# Patient Record
Sex: FEMALE | Race: WHITE | URBAN - METROPOLITAN AREA
[De-identification: names, ages, dates, MRNs, and addresses within clinical notes are randomized per-mention and may not be internally consistent; named-entity substitution may affect disease eponyms.]

---

## 2017-05-05 ENCOUNTER — OFFICE VISIT (OUTPATIENT)
Dept: INTERNAL MEDICINE CLINIC | Age: 27
End: 2017-05-05

## 2017-05-05 ENCOUNTER — TELEPHONE (OUTPATIENT)
Dept: INTERNAL MEDICINE CLINIC | Age: 27
End: 2017-05-05

## 2017-05-05 VITALS
TEMPERATURE: 98.2 F | WEIGHT: 136 LBS | SYSTOLIC BLOOD PRESSURE: 120 MMHG | DIASTOLIC BLOOD PRESSURE: 68 MMHG | BODY MASS INDEX: 24.48 KG/M2

## 2017-05-05 DIAGNOSIS — L70.9 ACNE, UNSPECIFIED ACNE TYPE: ICD-10-CM

## 2017-05-05 DIAGNOSIS — G43.109 MIGRAINE WITH AURA AND WITHOUT STATUS MIGRAINOSUS, NOT INTRACTABLE: Primary | ICD-10-CM

## 2017-05-05 PROCEDURE — 99213 OFFICE O/P EST LOW 20 MIN: CPT | Performed by: NURSE PRACTITIONER

## 2017-05-05 RX ORDER — RIZATRIPTAN BENZOATE 10 MG/1
10 TABLET ORAL
Qty: 12 TABLET | Refills: 3
Start: 2017-05-05 | End: 2017-07-19 | Stop reason: SDUPTHER

## 2017-05-05 RX ORDER — TOPIRAMATE 25 MG/1
25 TABLET ORAL NIGHTLY
Qty: 30 TABLET | Refills: 3 | Status: SHIPPED | OUTPATIENT
Start: 2017-05-05 | End: 2017-09-11 | Stop reason: SDUPTHER

## 2017-05-05 RX ORDER — DESOGESTREL AND ETHINYL ESTRADIOL AND ETHINYL ESTRADIOL 21-5 (28)
KIT ORAL
COMMUNITY
Start: 2017-04-25 | End: 2018-04-12 | Stop reason: CLARIF

## 2017-05-05 ASSESSMENT — ENCOUNTER SYMPTOMS
SORE THROAT: 0
VOMITING: 0
DIARRHEA: 0
NAUSEA: 0
FACIAL SWELLING: 0
SINUS PRESSURE: 0
COUGH: 0

## 2017-07-19 DIAGNOSIS — G43.109 MIGRAINE WITH AURA AND WITHOUT STATUS MIGRAINOSUS, NOT INTRACTABLE: ICD-10-CM

## 2017-07-19 RX ORDER — RIZATRIPTAN BENZOATE 10 MG/1
TABLET ORAL
Qty: 12 TABLET | Refills: 1 | Status: SHIPPED | OUTPATIENT
Start: 2017-07-19 | End: 2017-12-04 | Stop reason: SDUPTHER

## 2017-08-03 ENCOUNTER — OFFICE VISIT (OUTPATIENT)
Dept: INTERNAL MEDICINE CLINIC | Age: 27
End: 2017-08-03

## 2017-08-03 VITALS
SYSTOLIC BLOOD PRESSURE: 110 MMHG | BODY MASS INDEX: 23.58 KG/M2 | OXYGEN SATURATION: 100 % | HEART RATE: 64 BPM | WEIGHT: 131 LBS | DIASTOLIC BLOOD PRESSURE: 72 MMHG

## 2017-08-03 DIAGNOSIS — G43.109 MIGRAINE WITH AURA AND WITHOUT STATUS MIGRAINOSUS, NOT INTRACTABLE: Primary | ICD-10-CM

## 2017-08-03 PROCEDURE — 99213 OFFICE O/P EST LOW 20 MIN: CPT | Performed by: INTERNAL MEDICINE

## 2017-08-03 ASSESSMENT — ENCOUNTER SYMPTOMS
CHEST TIGHTNESS: 0
BACK PAIN: 0
CONSTIPATION: 0
COUGH: 0
NAUSEA: 0
WHEEZING: 0
DIARRHEA: 0
ABDOMINAL DISTENTION: 0
VOMITING: 0
SHORTNESS OF BREATH: 0

## 2017-08-03 ASSESSMENT — PATIENT HEALTH QUESTIONNAIRE - PHQ9
1. LITTLE INTEREST OR PLEASURE IN DOING THINGS: 0
SUM OF ALL RESPONSES TO PHQ QUESTIONS 1-9: 0
SUM OF ALL RESPONSES TO PHQ9 QUESTIONS 1 & 2: 0
2. FEELING DOWN, DEPRESSED OR HOPELESS: 0

## 2017-09-11 DIAGNOSIS — G43.109 MIGRAINE WITH AURA AND WITHOUT STATUS MIGRAINOSUS, NOT INTRACTABLE: ICD-10-CM

## 2017-09-11 RX ORDER — TOPIRAMATE 25 MG/1
TABLET ORAL
Qty: 30 TABLET | Refills: 1 | Status: SHIPPED | OUTPATIENT
Start: 2017-09-11 | End: 2017-11-10 | Stop reason: SDUPTHER

## 2017-11-10 DIAGNOSIS — G43.109 MIGRAINE WITH AURA AND WITHOUT STATUS MIGRAINOSUS, NOT INTRACTABLE: ICD-10-CM

## 2017-11-10 RX ORDER — TOPIRAMATE 25 MG/1
TABLET ORAL
Qty: 30 TABLET | Refills: 0 | Status: SHIPPED | OUTPATIENT
Start: 2017-11-10 | End: 2017-12-04 | Stop reason: SDUPTHER

## 2017-11-10 NOTE — TELEPHONE ENCOUNTER
Refill request for topirmate medication.      Name of [facility]- [facility]    Last visit - 8-3-2017     Pending visit - 12-4-2017    Last refill -[phone]    Medication Contract signed -  Jerardo lopez-   Additional Comments

## 2017-11-21 ENCOUNTER — TELEPHONE (OUTPATIENT)
Dept: INTERNAL MEDICINE CLINIC | Age: 27
End: 2017-11-21

## 2017-11-21 DIAGNOSIS — Z00.00 ROUTINE GENERAL MEDICAL EXAMINATION AT A HEALTH CARE FACILITY: Primary | ICD-10-CM

## 2017-11-21 NOTE — TELEPHONE ENCOUNTER
Patient has a wellness scheduled with Dr. Den Jamison on 12/4 and would like to have her blood work completed prior to that appointment. She indicated she will do after the 27th.

## 2017-12-01 DIAGNOSIS — Z00.00 ROUTINE GENERAL MEDICAL EXAMINATION AT A HEALTH CARE FACILITY: ICD-10-CM

## 2017-12-01 LAB
A/G RATIO: 1.4 (ref 1.1–2.2)
ALBUMIN SERPL-MCNC: 4.1 G/DL (ref 3.4–5)
ALP BLD-CCNC: 64 U/L (ref 40–129)
ALT SERPL-CCNC: 17 U/L (ref 10–40)
ANION GAP SERPL CALCULATED.3IONS-SCNC: 15 MMOL/L (ref 3–16)
AST SERPL-CCNC: 19 U/L (ref 15–37)
BILIRUB SERPL-MCNC: 0.4 MG/DL (ref 0–1)
BUN BLDV-MCNC: 17 MG/DL (ref 7–20)
CALCIUM SERPL-MCNC: 9.2 MG/DL (ref 8.3–10.6)
CHLORIDE BLD-SCNC: 106 MMOL/L (ref 99–110)
CHOLESTEROL, TOTAL: 140 MG/DL (ref 0–199)
CO2: 20 MMOL/L (ref 21–32)
CREAT SERPL-MCNC: 0.7 MG/DL (ref 0.6–1.1)
GFR AFRICAN AMERICAN: >60
GFR NON-AFRICAN AMERICAN: >60
GLOBULIN: 3 G/DL
GLUCOSE BLD-MCNC: 81 MG/DL (ref 70–99)
HDLC SERPL-MCNC: 54 MG/DL (ref 40–60)
LDL CHOLESTEROL CALCULATED: 62 MG/DL
POTASSIUM SERPL-SCNC: 4.1 MMOL/L (ref 3.5–5.1)
SODIUM BLD-SCNC: 141 MMOL/L (ref 136–145)
TOTAL PROTEIN: 7.1 G/DL (ref 6.4–8.2)
TRIGL SERPL-MCNC: 121 MG/DL (ref 0–150)
VLDLC SERPL CALC-MCNC: 24 MG/DL

## 2017-12-04 ENCOUNTER — OFFICE VISIT (OUTPATIENT)
Dept: INTERNAL MEDICINE CLINIC | Age: 27
End: 2017-12-04

## 2017-12-04 VITALS
BODY MASS INDEX: 22.32 KG/M2 | SYSTOLIC BLOOD PRESSURE: 122 MMHG | HEIGHT: 63 IN | WEIGHT: 126 LBS | OXYGEN SATURATION: 97 % | DIASTOLIC BLOOD PRESSURE: 70 MMHG | HEART RATE: 87 BPM

## 2017-12-04 DIAGNOSIS — G43.109 MIGRAINE WITH AURA AND WITHOUT STATUS MIGRAINOSUS, NOT INTRACTABLE: ICD-10-CM

## 2017-12-04 DIAGNOSIS — Z91.09 ENVIRONMENTAL ALLERGIES: ICD-10-CM

## 2017-12-04 DIAGNOSIS — Z00.00 ROUTINE GENERAL MEDICAL EXAMINATION AT A HEALTH CARE FACILITY: Primary | ICD-10-CM

## 2017-12-04 PROCEDURE — 99395 PREV VISIT EST AGE 18-39: CPT | Performed by: NURSE PRACTITIONER

## 2017-12-04 RX ORDER — RIZATRIPTAN BENZOATE 10 MG/1
TABLET ORAL
Qty: 12 TABLET | Refills: 3 | Status: SHIPPED | OUTPATIENT
Start: 2017-12-04 | End: 2018-10-12 | Stop reason: SDUPTHER

## 2017-12-04 RX ORDER — TOPIRAMATE 25 MG/1
TABLET ORAL
Qty: 90 TABLET | Refills: 3 | Status: SHIPPED | OUTPATIENT
Start: 2017-12-04 | End: 2018-11-07 | Stop reason: CLARIF

## 2017-12-04 ASSESSMENT — ENCOUNTER SYMPTOMS
DIARRHEA: 0
FACIAL SWELLING: 0
SORE THROAT: 0
NAUSEA: 0
VOMITING: 0
COUGH: 0
SINUS PRESSURE: 0

## 2017-12-04 NOTE — PROGRESS NOTES
nausea and vomiting. Genitourinary: Negative for difficulty urinating, dysuria, hematuria and urgency. Musculoskeletal: Negative for arthralgias and gait problem. Neurological: Positive for headaches. Negative for dizziness and weakness. Hematological: Negative for adenopathy. Psychiatric/Behavioral: Negative for sleep disturbance and suicidal ideas. Objective:   Physical Exam   Constitutional: She is oriented to person, place, and time. She appears well-developed and well-nourished. No distress. HENT:   Head: Normocephalic and atraumatic. Right Ear: External ear normal.   Left Ear: External ear normal.   Nose: Nose normal.   Mouth/Throat: Oropharynx is clear and moist.   Eyes: Conjunctivae and EOM are normal. Pupils are equal, round, and reactive to light. Neck: Normal range of motion. Neck supple. Cardiovascular: Normal rate, regular rhythm, normal heart sounds and intact distal pulses. No murmur heard. Pulmonary/Chest: Effort normal and breath sounds normal. She has no wheezes. She has no rales. Abdominal: Soft. Bowel sounds are normal. She exhibits no mass. There is no tenderness. There is no rebound and no guarding. Musculoskeletal: Normal range of motion. She exhibits no edema. Lymphadenopathy:     She has no cervical adenopathy. Neurological: She is alert and oriented to person, place, and time. No cranial nerve deficit. Skin: Skin is warm and dry. She is not diaphoretic. Psychiatric: She has a normal mood and affect. Her behavior is normal. Thought content normal.       Assessment:      1. Routine general medical examination at a health care facility  Continue healthy lifestyle choices. Enc exercise regularly and good dietary intake. Recommend annual eye exams; biannual dental exams.     2. Migraine with aura and without status migrainosus, not intractable  Continue medication as pt is stable    - topiramate (TOPAMAX) 25 MG tablet; TAKE ONE TABLET BY MOUTH ONCE NIGHTLY

## 2018-04-12 ENCOUNTER — OFFICE VISIT (OUTPATIENT)
Dept: INTERNAL MEDICINE CLINIC | Age: 28
End: 2018-04-12

## 2018-04-12 ENCOUNTER — HOSPITAL ENCOUNTER (OUTPATIENT)
Dept: GENERAL RADIOLOGY | Age: 28
Discharge: HOME | End: 2018-04-12
Attending: INTERNAL MEDICINE | Admitting: INTERNAL MEDICINE

## 2018-04-12 VITALS
HEART RATE: 73 BPM | OXYGEN SATURATION: 99 % | WEIGHT: 121 LBS | BODY MASS INDEX: 21.43 KG/M2 | DIASTOLIC BLOOD PRESSURE: 68 MMHG | SYSTOLIC BLOOD PRESSURE: 110 MMHG

## 2018-04-12 DIAGNOSIS — L29.9 ITCHING: Primary | ICD-10-CM

## 2018-04-12 DIAGNOSIS — K21.9 GASTROESOPHAGEAL REFLUX DISEASE WITHOUT ESOPHAGITIS: ICD-10-CM

## 2018-04-12 DIAGNOSIS — G43.109 MIGRAINE WITH AURA AND WITHOUT STATUS MIGRAINOSUS, NOT INTRACTABLE: ICD-10-CM

## 2018-04-12 DIAGNOSIS — R10.9 ABDOMINAL DISCOMFORT: ICD-10-CM

## 2018-04-12 DIAGNOSIS — J30.2 CHRONIC SEASONAL ALLERGIC RHINITIS, UNSPECIFIED TRIGGER: ICD-10-CM

## 2018-04-12 LAB
ANION GAP SERPL CALCULATED.3IONS-SCNC: 17 MMOL/L (ref 3–16)
BASOPHILS ABSOLUTE: 0 K/UL (ref 0–0.2)
BASOPHILS RELATIVE PERCENT: 0.5 %
BUN BLDV-MCNC: 16 MG/DL (ref 7–20)
CALCIUM SERPL-MCNC: 9 MG/DL (ref 8.3–10.6)
CHLORIDE BLD-SCNC: 101 MMOL/L (ref 99–110)
CO2: 22 MMOL/L (ref 21–32)
CREAT SERPL-MCNC: 0.6 MG/DL (ref 0.6–1.1)
EOSINOPHILS ABSOLUTE: 0.1 K/UL (ref 0–0.6)
EOSINOPHILS RELATIVE PERCENT: 1.5 %
GFR AFRICAN AMERICAN: >60
GFR NON-AFRICAN AMERICAN: >60
GLUCOSE BLD-MCNC: 88 MG/DL (ref 70–99)
HCT VFR BLD CALC: 43.7 % (ref 36–48)
HEMOGLOBIN: 15.1 G/DL (ref 12–16)
LYMPHOCYTES ABSOLUTE: 2.4 K/UL (ref 1–5.1)
LYMPHOCYTES RELATIVE PERCENT: 36.4 %
MCH RBC QN AUTO: 31.1 PG (ref 26–34)
MCHC RBC AUTO-ENTMCNC: 34.4 G/DL (ref 31–36)
MCV RBC AUTO: 90.3 FL (ref 80–100)
MONOCYTES ABSOLUTE: 0.8 K/UL (ref 0–1.3)
MONOCYTES RELATIVE PERCENT: 12.8 %
NEUTROPHILS ABSOLUTE: 3.2 K/UL (ref 1.7–7.7)
NEUTROPHILS RELATIVE PERCENT: 48.8 %
PDW BLD-RTO: 13.4 % (ref 12.4–15.4)
PLATELET # BLD: 210 K/UL (ref 135–450)
PMV BLD AUTO: 9.7 FL (ref 5–10.5)
POTASSIUM SERPL-SCNC: 4.2 MMOL/L (ref 3.5–5.1)
RBC # BLD: 4.84 M/UL (ref 4–5.2)
SODIUM BLD-SCNC: 140 MMOL/L (ref 136–145)
WBC # BLD: 6.5 K/UL (ref 4–11)

## 2018-04-12 PROCEDURE — 99214 OFFICE O/P EST MOD 30 MIN: CPT | Performed by: INTERNAL MEDICINE

## 2018-04-12 ASSESSMENT — ENCOUNTER SYMPTOMS
COUGH: 0
WHEEZING: 0
BACK PAIN: 0
VOMITING: 0
NAUSEA: 1
DIARRHEA: 0
ROS SKIN COMMENTS: ITCHING
BLOOD IN STOOL: 0
SHORTNESS OF BREATH: 0
CHEST TIGHTNESS: 0
ABDOMINAL PAIN: 1
RECTAL PAIN: 0
ABDOMINAL DISTENTION: 1
CONSTIPATION: 0

## 2018-05-23 ENCOUNTER — HOSPITAL ENCOUNTER (OUTPATIENT)
Dept: OTHER | Age: 28
Discharge: HOME | End: 2018-05-23
Attending: ALLERGY & IMMUNOLOGY | Admitting: ALLERGY & IMMUNOLOGY

## 2018-05-23 DIAGNOSIS — L29.9 ITCH OF SKIN: ICD-10-CM

## 2018-05-25 ENCOUNTER — OFFICE VISIT (OUTPATIENT)
Dept: INTERNAL MEDICINE CLINIC | Age: 28
End: 2018-05-25

## 2018-05-25 VITALS
HEART RATE: 76 BPM | BODY MASS INDEX: 22.14 KG/M2 | DIASTOLIC BLOOD PRESSURE: 70 MMHG | OXYGEN SATURATION: 98 % | SYSTOLIC BLOOD PRESSURE: 108 MMHG | WEIGHT: 125 LBS

## 2018-05-25 DIAGNOSIS — E04.1 THYROID NODULE: ICD-10-CM

## 2018-05-25 DIAGNOSIS — E03.9 ACQUIRED HYPOTHYROIDISM: Primary | ICD-10-CM

## 2018-05-25 PROCEDURE — 99213 OFFICE O/P EST LOW 20 MIN: CPT | Performed by: INTERNAL MEDICINE

## 2018-05-25 RX ORDER — LEVOTHYROXINE SODIUM 0.03 MG/1
25 TABLET ORAL DAILY
Qty: 30 TABLET | Refills: 3 | Status: SHIPPED | OUTPATIENT
Start: 2018-05-25 | End: 2018-08-07

## 2018-05-25 ASSESSMENT — ENCOUNTER SYMPTOMS
CHEST TIGHTNESS: 0
WHEEZING: 0
SHORTNESS OF BREATH: 0
BACK PAIN: 0
COUGH: 0
DIARRHEA: 0
NAUSEA: 0
ABDOMINAL DISTENTION: 0
VOMITING: 0
CONSTIPATION: 0

## 2018-06-04 ENCOUNTER — HOSPITAL ENCOUNTER (OUTPATIENT)
Dept: ULTRASOUND IMAGING | Age: 28
Discharge: HOME | End: 2018-06-04
Attending: INTERNAL MEDICINE | Admitting: INTERNAL MEDICINE

## 2018-06-04 DIAGNOSIS — E04.1 THYROID NODULE: ICD-10-CM

## 2018-06-04 DIAGNOSIS — E04.1 NONTOXIC SINGLE THYROID NODULE: ICD-10-CM

## 2018-06-07 ENCOUNTER — TELEPHONE (OUTPATIENT)
Dept: INTERNAL MEDICINE CLINIC | Age: 28
End: 2018-06-07

## 2018-06-07 DIAGNOSIS — E06.9 THYROIDITIS: Primary | ICD-10-CM

## 2018-06-26 DIAGNOSIS — E03.9 ACQUIRED HYPOTHYROIDISM: ICD-10-CM

## 2018-06-26 LAB — TSH SERPL DL<=0.05 MIU/L-ACNC: 2.74 UIU/ML (ref 0.27–4.2)

## 2018-08-07 ENCOUNTER — OFFICE VISIT (OUTPATIENT)
Dept: ENDOCRINOLOGY | Age: 28
End: 2018-08-07

## 2018-08-07 VITALS
DIASTOLIC BLOOD PRESSURE: 82 MMHG | SYSTOLIC BLOOD PRESSURE: 120 MMHG | TEMPERATURE: 98.5 F | OXYGEN SATURATION: 99 % | HEART RATE: 83 BPM | WEIGHT: 126.6 LBS | HEIGHT: 63 IN | BODY MASS INDEX: 22.43 KG/M2 | RESPIRATION RATE: 16 BRPM

## 2018-08-07 DIAGNOSIS — E06.3 HYPOTHYROIDISM DUE TO HASHIMOTO'S THYROIDITIS: Primary | ICD-10-CM

## 2018-08-07 DIAGNOSIS — E03.8 HYPOTHYROIDISM DUE TO HASHIMOTO'S THYROIDITIS: Primary | ICD-10-CM

## 2018-08-07 DIAGNOSIS — E03.8 HYPOTHYROIDISM DUE TO HASHIMOTO'S THYROIDITIS: ICD-10-CM

## 2018-08-07 DIAGNOSIS — E06.3 HYPOTHYROIDISM DUE TO HASHIMOTO'S THYROIDITIS: ICD-10-CM

## 2018-08-07 LAB
ANTI-THYROGLOB ABS: 160 IU/ML
T4 FREE: 1.4 NG/DL (ref 0.9–1.8)
THYROID PEROXIDASE (TPO) ABS: 129 IU/ML
TSH SERPL DL<=0.05 MIU/L-ACNC: 5.2 UIU/ML (ref 0.27–4.2)

## 2018-08-07 PROCEDURE — 99243 OFF/OP CNSLTJ NEW/EST LOW 30: CPT | Performed by: INTERNAL MEDICINE

## 2018-08-07 RX ORDER — LEVOTHYROXINE SODIUM 0.05 MG/1
50 TABLET ORAL DAILY
Qty: 30 TABLET | Refills: 5 | Status: SHIPPED | OUTPATIENT
Start: 2018-08-07 | End: 2019-02-05 | Stop reason: SDUPTHER

## 2018-08-07 NOTE — PROGRESS NOTES
thyroid disease  Patient is being seen at the request of [facility], MD      Diagnosed with Hypothyroidism in 05/18    Was having allergies  Saw allergy specialist who ordered labs. Labs in 05/18 showed TSH of 5.9      Current thyroid medication: levothyroxine (Synthroid)- 25  mcg daily since 05/18  Takes it first thing in the morning on empty stomach- yes  Interfering medications including calcium, vitamin D , iron tablets, Proton pump inhibitors: no    FH of hypothyroidism: Both parents have hypothyroidism. Brother has enlarged thyroid. FH of thyroid cancer: no    She is feeling less tired and fatigued since she started levothyroxine. Weight is stable. No dry skin , hair loss. No difficulty swallowing    She is engaged. Works for [employer]. Review of system Please see the scanned document filled by the patient, reviewed  by me today. Physical Exam   Constitutional: She is oriented to person, place, and time. She appears well-developed. No distress. HENT:   Mouth/Throat: Oropharynx is clear and moist.   Eyes: EOM are normal.   Neck: No thyromegaly present. Cardiovascular: Normal rate and normal heart sounds. Pulmonary/Chest: Effort normal. No respiratory distress. She has no wheezes. Abdominal: Soft. Bowel sounds are normal. There is no tenderness. Musculoskeletal: She exhibits no edema. Neurological: She is alert and oriented to person, place, and time. Skin: Skin is warm and dry. She is not diaphoretic. Psychiatric: Her behavior is normal. Thought content normal.       Thyroid ultrasound 06/18        HISTORY: Thyroid nodule       Right thyroid lobe 1.5 x 1.3 x 4.9 cm       Left lobe 1.7 x 1.4 x 4.0 cm       Thyroid isthmus 3 mm.       Entire gland demonstrates diffuse heterogeneous appearance without   discrete dominant nodule. No extrathyroidal mass identified.           Impression       Normal thyroid size.  Heterogeneous appearance is nonspecific but   can be associated with thyroiditis-please correlate clinically               Assessment/Plan      1. Hypothyroidism    This 32 yrs old female was diagnosed with hypothyroidism in 05/18    Had thyroid US in 06/18    Reviewed US images myself in office today. US showed heterogenous parenchyma. US appearance consistent with hashimoto's thyroiditis. She is on levothyroxine 25 mcg daily. She feels better. Discussed need to take to levothyroxine all her life  Reviewed management of hypothyroidism during pregnancy. Will repeat labs today  Will check thyroid antibodies.      Will send message on my chart

## 2018-11-07 ENCOUNTER — HOSPITAL ENCOUNTER (OUTPATIENT)
Age: 28
Discharge: HOME | End: 2018-11-07
Payer: COMMERCIAL

## 2018-11-07 ENCOUNTER — OFFICE VISIT (OUTPATIENT)
Dept: INTERNAL MEDICINE CLINIC | Age: 28
End: 2018-11-07
Payer: COMMERCIAL

## 2018-11-07 VITALS
HEART RATE: 85 BPM | OXYGEN SATURATION: 99 % | HEIGHT: 63 IN | DIASTOLIC BLOOD PRESSURE: 66 MMHG | WEIGHT: 129.8 LBS | TEMPERATURE: 98.3 F | SYSTOLIC BLOOD PRESSURE: 110 MMHG | BODY MASS INDEX: 23 KG/M2

## 2018-11-07 DIAGNOSIS — Z00.00 ROUTINE GENERAL MEDICAL EXAMINATION AT A HEALTH CARE FACILITY: Primary | ICD-10-CM

## 2018-11-07 DIAGNOSIS — E06.3 HYPOTHYROIDISM DUE TO HASHIMOTO'S THYROIDITIS: ICD-10-CM

## 2018-11-07 DIAGNOSIS — G43.109 MIGRAINE WITH AURA AND WITHOUT STATUS MIGRAINOSUS, NOT INTRACTABLE: ICD-10-CM

## 2018-11-07 DIAGNOSIS — Z91.09 ENVIRONMENTAL ALLERGIES: ICD-10-CM

## 2018-11-07 DIAGNOSIS — Z00.00 ROUTINE GENERAL MEDICAL EXAMINATION AT A HEALTH CARE FACILITY: ICD-10-CM

## 2018-11-07 DIAGNOSIS — E03.8 HYPOTHYROIDISM DUE TO HASHIMOTO'S THYROIDITIS: ICD-10-CM

## 2018-11-07 PROCEDURE — 99395 PREV VISIT EST AGE 18-39: CPT | Performed by: NURSE PRACTITIONER

## 2018-11-07 RX ORDER — RIZATRIPTAN BENZOATE 10 MG/1
TABLET ORAL
Qty: 12 TABLET | Refills: 5 | Status: SHIPPED | OUTPATIENT
Start: 2018-11-07 | End: 2019-05-09 | Stop reason: SDUPTHER

## 2018-11-07 RX ORDER — CETIRIZINE HYDROCHLORIDE 10 MG/1
10 TABLET ORAL DAILY
COMMUNITY

## 2018-11-07 ASSESSMENT — ENCOUNTER SYMPTOMS
NAUSEA: 0
SORE THROAT: 0
COUGH: 0
FACIAL SWELLING: 0
VOMITING: 0
SINUS PRESSURE: 0
DIARRHEA: 0

## 2018-11-07 NOTE — PROGRESS NOTES
Mother         migraines    Thyroid Disease Mother     Hypertension Mother     Thyroid Disease Father     Other Father         anxiety    Cancer Paternal Grandmother         breast    Thyroid Disease Brother         Thyroid nodule         Review of Systems   Constitutional: Negative for appetite change, chills, fatigue, fever and unexpected weight change. HENT: Negative for congestion, ear discharge, ear pain, facial swelling, hearing loss, sinus pressure, sneezing and sore throat. Respiratory: Negative for cough. Cardiovascular: Negative for chest pain. Gastrointestinal: Negative for diarrhea, nausea and vomiting. Genitourinary: Negative for difficulty urinating, dysuria, hematuria and urgency. Musculoskeletal: Negative for arthralgias and gait problem. Neurological: Positive for headaches. Negative for dizziness and weakness. Hematological: Negative for adenopathy. Psychiatric/Behavioral: Negative for sleep disturbance and suicidal ideas. Objective:   Physical Exam   Constitutional: She is oriented to person, place, and time. She appears well-developed and well-nourished. No distress. HENT:   Head: Normocephalic and atraumatic. Right Ear: External ear normal.   Left Ear: External ear normal.   Nose: Nose normal.   Mouth/Throat: Oropharynx is clear and moist.   Corrective lenses   Eyes: Pupils are equal, round, and reactive to light. Conjunctivae and EOM are normal.   Neck: Normal range of motion. Neck supple. Cardiovascular: Normal rate, regular rhythm, normal heart sounds and intact distal pulses. Pulmonary/Chest: Effort normal and breath sounds normal. She has no wheezes. She has no rales. Abdominal: Soft. Bowel sounds are normal. There is no tenderness. Musculoskeletal: She exhibits no edema. Neurological: She is alert and oriented to person, place, and time. No cranial nerve deficit. Skin: She is not diaphoretic. Psychiatric: She has a normal mood and affect. Her behavior is normal. Thought content normal.       Assessment:      1. Routine general medical examination at a health care facility  Continue healthy lifestyle choices. Enc exercise regularly and good dietary intake. Recommend annual eye exams; biannual dental exams.    - Comprehensive Metabolic Panel; Future  - Lipid Panel; Future  - TSH with Reflex; Future    2. Migraine with aura and without status migrainosus, not intractable  Reviewed Aimovig medication with patient and how to use medication, once monthly  Will send rx to [facility] and see about patient's coverage. Continue maxalt at this time  If Abdulkadir Moan is too costly, consider Amitriptyline     - rizatriptan (MAXALT) 10 MG tablet; TAKE ONE TABLET BY MOUTH ONCE AS NEEDED FOR MIGRAINE. MAY REPEAT IN TWO HOURS IF NEEDED. MAX OF THREE TABLETS PER DAY. Dispense: 12 tablet; Refill: 5  - Erenumab-aooe (AIMOVIG) 70 MG/ML SOAJ; Inject into skin every 4 weeks, failed Topomax  Dispense: 4 pen; Refill: 1    3. Hypothyroidism due to Hashimoto's thyroiditis  Monitor, check TSH and f/u [facility]    4. Environmental allergies  Monitor, stable          Plan:      See above plan    Return in about 3 months (around 2/7/2019) for Migraine f/u.           EMERY Mata - CNP

## 2018-11-08 ENCOUNTER — TELEPHONE (OUTPATIENT)
Dept: FAMILY MEDICINE CLINIC | Age: 28
End: 2018-11-08

## 2018-11-08 LAB
A/G RATIO: 1.8 (ref 1.1–2.2)
ALBUMIN SERPL-MCNC: 4.5 G/DL (ref 3.4–5)
ALP BLD-CCNC: 75 U/L (ref 40–129)
ALT SERPL-CCNC: 13 U/L (ref 10–40)
ANION GAP SERPL CALCULATED.3IONS-SCNC: 12 MMOL/L (ref 3–16)
AST SERPL-CCNC: 16 U/L (ref 15–37)
BILIRUB SERPL-MCNC: 0.5 MG/DL (ref 0–1)
BUN BLDV-MCNC: 15 MG/DL (ref 7–20)
CALCIUM SERPL-MCNC: 9.4 MG/DL (ref 8.3–10.6)
CHLORIDE BLD-SCNC: 104 MMOL/L (ref 99–110)
CHOLESTEROL, TOTAL: 130 MG/DL (ref 0–199)
CO2: 24 MMOL/L (ref 21–32)
CREAT SERPL-MCNC: 0.7 MG/DL (ref 0.6–1.1)
GFR AFRICAN AMERICAN: >60
GFR NON-AFRICAN AMERICAN: >60
GLOBULIN: 2.5 G/DL
GLUCOSE BLD-MCNC: 80 MG/DL (ref 70–99)
HDLC SERPL-MCNC: 69 MG/DL (ref 40–60)
LDL CHOLESTEROL CALCULATED: 46 MG/DL
POTASSIUM SERPL-SCNC: 4.3 MMOL/L (ref 3.5–5.1)
SODIUM BLD-SCNC: 140 MMOL/L (ref 136–145)
TOTAL PROTEIN: 7 G/DL (ref 6.4–8.2)
TRIGL SERPL-MCNC: 75 MG/DL (ref 0–150)
TSH REFLEX: 3.04 UIU/ML (ref 0.27–4.2)
VLDLC SERPL CALC-MCNC: 15 MG/DL

## 2019-02-04 ENCOUNTER — OFFICE VISIT (OUTPATIENT)
Dept: INTERNAL MEDICINE CLINIC | Age: 29
End: 2019-02-04
Payer: COMMERCIAL

## 2019-02-04 VITALS
SYSTOLIC BLOOD PRESSURE: 120 MMHG | HEART RATE: 91 BPM | DIASTOLIC BLOOD PRESSURE: 64 MMHG | WEIGHT: 133 LBS | OXYGEN SATURATION: 98 % | BODY MASS INDEX: 23.75 KG/M2

## 2019-02-04 DIAGNOSIS — G43.109 MIGRAINE WITH AURA AND WITHOUT STATUS MIGRAINOSUS, NOT INTRACTABLE: Primary | ICD-10-CM

## 2019-02-04 DIAGNOSIS — E06.3 HYPOTHYROIDISM DUE TO HASHIMOTO'S THYROIDITIS: ICD-10-CM

## 2019-02-04 DIAGNOSIS — Z91.09 ENVIRONMENTAL ALLERGIES: ICD-10-CM

## 2019-02-04 DIAGNOSIS — E03.8 HYPOTHYROIDISM DUE TO HASHIMOTO'S THYROIDITIS: ICD-10-CM

## 2019-02-04 PROCEDURE — 99213 OFFICE O/P EST LOW 20 MIN: CPT | Performed by: NURSE PRACTITIONER

## 2019-02-04 RX ORDER — AMITRIPTYLINE HYDROCHLORIDE 25 MG/1
25 TABLET, FILM COATED ORAL NIGHTLY
Qty: 30 TABLET | Refills: 5 | Status: SHIPPED | OUTPATIENT
Start: 2019-02-04 | End: 2019-02-08 | Stop reason: SDUPTHER

## 2019-02-04 ASSESSMENT — PATIENT HEALTH QUESTIONNAIRE - PHQ9
SUM OF ALL RESPONSES TO PHQ9 QUESTIONS 1 & 2: 0
1. LITTLE INTEREST OR PLEASURE IN DOING THINGS: 0
SUM OF ALL RESPONSES TO PHQ QUESTIONS 1-9: 0
2. FEELING DOWN, DEPRESSED OR HOPELESS: 0
SUM OF ALL RESPONSES TO PHQ QUESTIONS 1-9: 0

## 2019-02-05 RX ORDER — LEVOTHYROXINE SODIUM 0.05 MG/1
TABLET ORAL
Qty: 30 TABLET | Refills: 4 | Status: SHIPPED | OUTPATIENT
Start: 2019-02-05 | End: 2019-06-26

## 2019-02-07 ASSESSMENT — ENCOUNTER SYMPTOMS
VOMITING: 0
NAUSEA: 0
FACIAL SWELLING: 0
DIARRHEA: 0
COUGH: 0
SINUS PRESSURE: 0
SORE THROAT: 0

## 2019-02-08 DIAGNOSIS — G43.109 MIGRAINE WITH AURA AND WITHOUT STATUS MIGRAINOSUS, NOT INTRACTABLE: ICD-10-CM

## 2019-02-12 ENCOUNTER — OFFICE VISIT (OUTPATIENT)
Dept: ENDOCRINOLOGY | Age: 29
End: 2019-02-12
Payer: COMMERCIAL

## 2019-02-12 VITALS
HEIGHT: 63 IN | HEART RATE: 72 BPM | SYSTOLIC BLOOD PRESSURE: 110 MMHG | WEIGHT: 134 LBS | DIASTOLIC BLOOD PRESSURE: 72 MMHG | BODY MASS INDEX: 23.74 KG/M2

## 2019-02-12 DIAGNOSIS — E06.3 HYPOTHYROIDISM DUE TO HASHIMOTO'S THYROIDITIS: ICD-10-CM

## 2019-02-12 DIAGNOSIS — E03.8 HYPOTHYROIDISM DUE TO HASHIMOTO'S THYROIDITIS: Primary | ICD-10-CM

## 2019-02-12 DIAGNOSIS — E03.8 HYPOTHYROIDISM DUE TO HASHIMOTO'S THYROIDITIS: ICD-10-CM

## 2019-02-12 DIAGNOSIS — E06.3 HYPOTHYROIDISM DUE TO HASHIMOTO'S THYROIDITIS: Primary | ICD-10-CM

## 2019-02-12 LAB — TSH SERPL DL<=0.05 MIU/L-ACNC: 1.83 UIU/ML (ref 0.27–4.2)

## 2019-02-12 PROCEDURE — 99213 OFFICE O/P EST LOW 20 MIN: CPT | Performed by: INTERNAL MEDICINE

## 2019-02-12 ASSESSMENT — ENCOUNTER SYMPTOMS
ABDOMINAL PAIN: 0
COUGH: 0
SHORTNESS OF BREATH: 0
BACK PAIN: 0
NAUSEA: 0
VOMITING: 0
SORE THROAT: 0

## 2019-02-14 RX ORDER — AMITRIPTYLINE HYDROCHLORIDE 25 MG/1
25 TABLET, FILM COATED ORAL NIGHTLY
Qty: 30 TABLET | Refills: 5 | Status: SHIPPED | OUTPATIENT
Start: 2019-02-14 | End: 2019-08-05 | Stop reason: SDUPTHER

## 2019-05-09 DIAGNOSIS — G43.109 MIGRAINE WITH AURA AND WITHOUT STATUS MIGRAINOSUS, NOT INTRACTABLE: ICD-10-CM

## 2019-05-10 RX ORDER — RIZATRIPTAN BENZOATE 10 MG/1
TABLET ORAL
Qty: 12 TABLET | Refills: 2 | Status: SHIPPED | OUTPATIENT
Start: 2019-05-10 | End: 2019-12-09 | Stop reason: SDUPTHER

## 2019-05-10 NOTE — TELEPHONE ENCOUNTER
Refill request for maxalt medication.      Name of [facility]- [facility]    Last visit - 2-4-19     Pending visit - 8-5-19    Last refill -11-7-18    Medication Contract signed -   Jerardo lopez-     Additional Comments

## 2019-06-25 ENCOUNTER — OFFICE VISIT (OUTPATIENT)
Dept: ENDOCRINOLOGY | Age: 29
End: 2019-06-25
Payer: COMMERCIAL

## 2019-06-25 VITALS
DIASTOLIC BLOOD PRESSURE: 78 MMHG | OXYGEN SATURATION: 97 % | WEIGHT: 137.2 LBS | HEIGHT: 63 IN | BODY MASS INDEX: 24.31 KG/M2 | HEART RATE: 79 BPM | SYSTOLIC BLOOD PRESSURE: 115 MMHG

## 2019-06-25 DIAGNOSIS — E06.3 HYPOTHYROIDISM DUE TO HASHIMOTO'S THYROIDITIS: ICD-10-CM

## 2019-06-25 DIAGNOSIS — E06.3 HYPOTHYROIDISM DUE TO HASHIMOTO'S THYROIDITIS: Primary | ICD-10-CM

## 2019-06-25 DIAGNOSIS — E03.8 HYPOTHYROIDISM DUE TO HASHIMOTO'S THYROIDITIS: ICD-10-CM

## 2019-06-25 DIAGNOSIS — E03.8 HYPOTHYROIDISM DUE TO HASHIMOTO'S THYROIDITIS: Primary | ICD-10-CM

## 2019-06-25 LAB — TSH SERPL DL<=0.05 MIU/L-ACNC: 4.23 UIU/ML (ref 0.27–4.2)

## 2019-06-25 PROCEDURE — 99213 OFFICE O/P EST LOW 20 MIN: CPT | Performed by: INTERNAL MEDICINE

## 2019-06-25 RX ORDER — FLUTICASONE PROPIONATE 50 MCG
1 SPRAY, SUSPENSION (ML) NASAL PRN
COMMUNITY

## 2019-06-25 RX ORDER — LEVOTHYROXINE SODIUM 0.05 MG/1
TABLET ORAL
Qty: 30 TABLET | Refills: 4 | Status: CANCELLED | OUTPATIENT
Start: 2019-06-25

## 2019-06-25 ASSESSMENT — ENCOUNTER SYMPTOMS
SORE THROAT: 0
COUGH: 0
ABDOMINAL PAIN: 0
SHORTNESS OF BREATH: 0
VOMITING: 0
BACK PAIN: 0
NAUSEA: 0

## 2019-06-25 NOTE — PROGRESS NOTES
Endocrinology       Zana Pedroza M.D. Phone: [phone]   FAX: [phone]       Shannan Guajardo   YOB: 1990    Date of Visit:  6/25/2019    Allergies   Allergen Reactions    Topamax [Topiramate] Hives     Outpatient Medications Marked as Taking for the 6/25/19 encounter (Office Visit) with Long Bedoya MD   Medication Sig Dispense Refill    fluticasone (FLONASE) 50 MCG/ACT nasal spray 1 spray by Each Nostril route as needed for Rhinitis      rizatriptan (MAXALT) 10 MG tablet TAKE ONE TABLET BY MOUTH ONCE AS NEEDED FOR MIGRAINE. MAY REPEAT IN TWO HOURS IF NEEDED. MAX OF THREE TABLETS PER DAY. 12 tablet 2    amitriptyline (ELAVIL) 25 MG tablet Take 1 tablet by mouth nightly 30 tablet 5    levothyroxine (SYNTHROID) 50 MCG tablet TAKE ONE TABLET BY MOUTH DAILY 30 tablet 4    cetirizine (ZYRTEC) 10 MG tablet Take 10 mg by mouth daily           Vitals:    06/25/19 1621   BP: 115/78   Site: Left Upper Arm   Position: Sitting   Cuff Size: Medium Adult   Pulse: 79   SpO2: 97%   Weight: 137 lb 3.2 oz (62.2 kg)   Height: 5' 2.75\" (1.594 m)     Body mass index is 24.5 kg/m². Wt Readings from Last 3 Encounters:   06/25/19 137 lb 3.2 oz (62.2 kg)   02/12/19 134 lb (60.8 kg)   02/04/19 133 lb (60.3 kg)     BP Readings from Last 3 Encounters:   06/25/19 115/78   02/12/19 110/72   02/04/19 120/64        History reviewed. No pertinent past medical history.   Past Surgical History:   Procedure Laterality Date    TONSILLECTOMY AND ADENOIDECTOMY  07/25/2016    WISDOM TOOTH EXTRACTION       Family History   Problem Relation Age of Onset    Other Mother         migraines    Thyroid Disease Mother     Hypertension Mother     Thyroid Disease Father     Other Father         anxiety    Cancer Paternal Grandmother         breast    Thyroid Disease Brother         Thyroid nodule     Social History     Tobacco Use   Smoking Status Never Smoker   Smokeless Tobacco Never Used      Social History     Substance and Sexual Activity   Alcohol Use Yes    Alcohol/week: 1.8 oz    Types: 3 Cans of beer per week       HPI      Shannan Guajardo is a 29 y.o. female who is here for  A follow-up for management of thyroid disease  PCP :  Sabine Wynn MD      Diagnosed with Hypothyroidism in 05/18    Was having allergies  Saw allergy specialist who ordered labs. Labs in 05/18 showed TSH of 5.9      Current thyroid medication: levothyroxine (Synthroid)- 50  mcg daily since 08/18    Takes it first thing in the morning on empty stomach- yes  Interfering medications including calcium, vitamin D , iron tablets, Proton pump inhibitors: no    FH of hypothyroidism: Both parents have hypothyroidism. Brother has enlarged thyroid. FH of thyroid cancer: no    She is feeling less tired and fatigued since she started levothyroxine. Weight is stable. No dry skin , hair loss. No difficulty swallowing    She is engaged. Planning a child in 2020. Works for [employer]. Review of Systems   HENT: Negative for sore throat. Respiratory: Negative for cough and shortness of breath. Cardiovascular: Negative for chest pain and palpitations. Gastrointestinal: Negative for abdominal pain, nausea and vomiting. Endocrine: Negative for polydipsia. Genitourinary: Negative for frequency and urgency. Musculoskeletal: Negative for back pain and myalgias. Skin: Negative for rash. Neurological: Negative for headaches. Psychiatric/Behavioral: The patient is not nervous/anxious. Physical Exam   Constitutional: She is oriented to person, place, and time. She appears well-developed. No distress. HENT:   Mouth/Throat: Oropharynx is clear and moist.   Eyes: EOM are normal.   Neck: No thyromegaly present. Cardiovascular: Normal rate and normal heart sounds. Pulmonary/Chest: Effort normal. No respiratory distress. She has no wheezes. Abdominal: Soft.  Bowel sounds are normal. There is no tenderness. Musculoskeletal: She exhibits no edema. Neurological: She is alert and oriented to person, place, and time. Skin: Skin is warm and dry. She is not diaphoretic. Psychiatric: Her behavior is normal. Thought content normal.       Thyroid ultrasound 06/18        HISTORY: Thyroid nodule       Right thyroid lobe 1.5 x 1.3 x 4.9 cm       Left lobe 1.7 x 1.4 x 4.0 cm       Thyroid isthmus 3 mm.       Entire gland demonstrates diffuse heterogeneous appearance without   discrete dominant nodule. No extrathyroidal mass identified.           Impression       Normal thyroid size. Heterogeneous appearance is nonspecific but   can be associated with thyroiditis-please correlate clinically               Assessment/Plan      1. Hypothyroidism    This 29 yrs old female was diagnosed with hypothyroidism in 05/18    Had thyroid US in 06/18    US showed heterogenous parenchyma. US appearance consistent with hashimoto's thyroiditis. TPO antibodies +ve    She is on levothyroxine 50 mcg daily. She feels better.     TSH 1.83 in 02/19    Will repeat labs        Will send message on my chart

## 2019-06-26 RX ORDER — LEVOTHYROXINE SODIUM 0.07 MG/1
75 TABLET ORAL DAILY
Qty: 30 TABLET | Refills: 5 | Status: SHIPPED | OUTPATIENT
Start: 2019-06-26 | End: 2019-12-24

## 2019-08-05 ENCOUNTER — OFFICE VISIT (OUTPATIENT)
Dept: INTERNAL MEDICINE CLINIC | Age: 29
End: 2019-08-05
Payer: COMMERCIAL

## 2019-08-05 VITALS
HEART RATE: 90 BPM | DIASTOLIC BLOOD PRESSURE: 62 MMHG | WEIGHT: 139 LBS | BODY MASS INDEX: 24.82 KG/M2 | OXYGEN SATURATION: 98 % | SYSTOLIC BLOOD PRESSURE: 120 MMHG

## 2019-08-05 DIAGNOSIS — J30.2 SEASONAL ALLERGIES: ICD-10-CM

## 2019-08-05 DIAGNOSIS — E03.8 HYPOTHYROIDISM DUE TO HASHIMOTO'S THYROIDITIS: ICD-10-CM

## 2019-08-05 DIAGNOSIS — Z23 NEED FOR DIPHTHERIA-TETANUS-PERTUSSIS (TDAP) VACCINE: ICD-10-CM

## 2019-08-05 DIAGNOSIS — G43.109 MIGRAINE WITH AURA AND WITHOUT STATUS MIGRAINOSUS, NOT INTRACTABLE: Primary | ICD-10-CM

## 2019-08-05 DIAGNOSIS — E06.3 HYPOTHYROIDISM DUE TO HASHIMOTO'S THYROIDITIS: ICD-10-CM

## 2019-08-05 PROCEDURE — 90715 TDAP VACCINE 7 YRS/> IM: CPT | Performed by: NURSE PRACTITIONER

## 2019-08-05 PROCEDURE — 99213 OFFICE O/P EST LOW 20 MIN: CPT | Performed by: NURSE PRACTITIONER

## 2019-08-05 PROCEDURE — 90471 IMMUNIZATION ADMIN: CPT | Performed by: NURSE PRACTITIONER

## 2019-08-05 RX ORDER — AMITRIPTYLINE HYDROCHLORIDE 25 MG/1
50 TABLET, FILM COATED ORAL NIGHTLY
Qty: 180 TABLET | Refills: 1 | Status: SHIPPED | OUTPATIENT
Start: 2019-08-05 | End: 2020-02-05

## 2019-08-05 RX ORDER — GREEN TEA/HOODIA GORDONII 315-12.5MG
CAPSULE ORAL
COMMUNITY
End: 2020-06-16

## 2019-08-05 ASSESSMENT — ENCOUNTER SYMPTOMS
NAUSEA: 0
COUGH: 0
SORE THROAT: 0
VOMITING: 0
FACIAL SWELLING: 0
DIARRHEA: 0
SINUS PRESSURE: 0

## 2019-08-05 NOTE — PROGRESS NOTES
Subjective:      Patient ID: Everardo Yang is a 29 y.o. female. HPI  Chief Complaint   Patient presents with    Medication Check     Migraines. Freq of migraines is decreasing. She might notice a couple days in a row and then resolve. Hormonal changes and weather shifts are triggers. She will use Maxalt once daily and this will get rid of migraine. Using 2-3 maxalt/month. Elavil at bedtime. No side effects except groggy in morning if she takes too late. Seasonal allergies. Flonase NS and Zyrtec prn. Hypothyroidism. Endocrinology manages. Stable but does feel some fatigue. Due Tdap. Prior to Visit Medications    Medication Sig Taking? Authorizing Provider   Lactobacillus (PROBIOTIC ACIDOPHILUS) TABS Take by mouth Yes Historical Provider, MD   amitriptyline (ELAVIL) 25 MG tablet Take 2 tablets by mouth nightly Yes EMERY Dejesus CNP   levothyroxine (SYNTHROID) 75 MCG tablet Take 1 tablet by mouth daily Yes Cornel Payne MD   fluticasone (FLONASE) 50 MCG/ACT nasal spray 1 spray by Each Nostril route as needed for Rhinitis Yes Historical Provider, MD   rizatriptan (MAXALT) 10 MG tablet TAKE ONE TABLET BY MOUTH ONCE AS NEEDED FOR MIGRAINE. MAY REPEAT IN TWO HOURS IF NEEDED. MAX OF THREE TABLETS PER DAY.  Yes EMERY Shore CNP   cetirizine (ZYRTEC) 10 MG tablet Take 10 mg by mouth daily Yes Historical Provider, MD     Social History     Socioeconomic History    Marital status: Single     Spouse name: Not on file    Number of children: Not on file    Years of education: Not on file    Highest education level: Not on file   Occupational History    Not on file   Social Needs    Financial resource strain: Not on file    Food insecurity:     Worry: Not on file     Inability: Not on file    Transportation needs:     Medical: Not on file     Non-medical: Not on file   Tobacco Use    Smoking status: Never Smoker    Smokeless tobacco: Never Used   Substance and Sexual Activity  Alcohol use: Yes     Alcohol/week: 3.0 standard drinks     Types: 3 Cans of beer per week    Drug use: No    Sexual activity: Yes     Partners: Male   Lifestyle    Physical activity:     Days per week: Not on file     Minutes per session: Not on file    Stress: Not on file   Relationships    Social connections:     Talks on phone: Not on file     Gets together: Not on file     Attends L[employer]-day service: Not on file     Active member of club or organization: Not on file     Attends meetings of clubs or organizations: Not on file     Relationship status: Not on file    Intimate partner violence:     Fear of current or ex partner: Not on file     Emotionally abused: Not on file     Physically abused: Not on file     Forced sexual activity: Not on file   Other Topics Concern    Not on file   Social History Narrative    Not on file     Family History   Problem Relation Age of Onset    Other Mother         migraines    Thyroid Disease Mother     Hypertension Mother     Thyroid Disease Father     Other Father         anxiety    Cancer Paternal Grandmother         breast    Thyroid Disease Brother         Thyroid nodule       Review of Systems   Constitutional: Negative for appetite change, chills, fatigue, fever and unexpected weight change. HENT: Negative for congestion, ear discharge, ear pain, facial swelling, hearing loss, sinus pressure, sneezing and sore throat. Respiratory: Negative for cough. Cardiovascular: Negative for chest pain. Gastrointestinal: Negative for diarrhea, nausea and vomiting. Genitourinary: Negative for difficulty urinating, dysuria, hematuria and urgency. Musculoskeletal: Negative for arthralgias and gait problem. Neurological: Negative for dizziness, weakness and headaches. Hematological: Negative for adenopathy. Psychiatric/Behavioral: Negative for sleep disturbance and suicidal ideas.        Objective:   Physical Exam   Constitutional: She is oriented to

## 2019-09-16 DIAGNOSIS — E06.3 HYPOTHYROIDISM DUE TO HASHIMOTO'S THYROIDITIS: Primary | ICD-10-CM

## 2019-09-16 DIAGNOSIS — E03.8 HYPOTHYROIDISM DUE TO HASHIMOTO'S THYROIDITIS: Primary | ICD-10-CM

## 2019-09-18 DIAGNOSIS — E06.3 HYPOTHYROIDISM DUE TO HASHIMOTO'S THYROIDITIS: ICD-10-CM

## 2019-09-18 DIAGNOSIS — E03.8 HYPOTHYROIDISM DUE TO HASHIMOTO'S THYROIDITIS: ICD-10-CM

## 2019-09-18 LAB — TSH SERPL DL<=0.05 MIU/L-ACNC: 2.28 UIU/ML (ref 0.27–4.2)

## 2019-12-09 DIAGNOSIS — G43.109 MIGRAINE WITH AURA AND WITHOUT STATUS MIGRAINOSUS, NOT INTRACTABLE: ICD-10-CM

## 2019-12-10 RX ORDER — RIZATRIPTAN BENZOATE 10 MG/1
TABLET ORAL
Qty: 12 TABLET | Refills: 1 | Status: SHIPPED | OUTPATIENT
Start: 2019-12-10 | End: 2020-02-05

## 2019-12-17 ENCOUNTER — OFFICE VISIT (OUTPATIENT)
Dept: ENDOCRINOLOGY | Age: 29
End: 2019-12-17
Payer: COMMERCIAL

## 2019-12-17 VITALS
HEIGHT: 63 IN | HEART RATE: 80 BPM | DIASTOLIC BLOOD PRESSURE: 75 MMHG | OXYGEN SATURATION: 98 % | SYSTOLIC BLOOD PRESSURE: 123 MMHG | BODY MASS INDEX: 24.63 KG/M2 | WEIGHT: 139 LBS

## 2019-12-17 DIAGNOSIS — E06.3 HYPOTHYROIDISM DUE TO HASHIMOTO'S THYROIDITIS: Primary | ICD-10-CM

## 2019-12-17 DIAGNOSIS — E03.8 HYPOTHYROIDISM DUE TO HASHIMOTO'S THYROIDITIS: Primary | ICD-10-CM

## 2019-12-17 PROCEDURE — 99213 OFFICE O/P EST LOW 20 MIN: CPT | Performed by: INTERNAL MEDICINE

## 2019-12-17 ASSESSMENT — ENCOUNTER SYMPTOMS
BACK PAIN: 0
COUGH: 0
SORE THROAT: 0
NAUSEA: 0
ABDOMINAL PAIN: 0
VOMITING: 0
SHORTNESS OF BREATH: 0

## 2019-12-18 DIAGNOSIS — E06.3 HYPOTHYROIDISM DUE TO HASHIMOTO'S THYROIDITIS: ICD-10-CM

## 2019-12-18 DIAGNOSIS — E03.8 HYPOTHYROIDISM DUE TO HASHIMOTO'S THYROIDITIS: ICD-10-CM

## 2019-12-18 LAB
T4 FREE: 1.7 NG/DL (ref 0.9–1.8)
TSH SERPL DL<=0.05 MIU/L-ACNC: 0.21 UIU/ML (ref 0.27–4.2)

## 2019-12-24 ENCOUNTER — TELEPHONE (OUTPATIENT)
Dept: ENDOCRINOLOGY | Age: 29
End: 2019-12-24

## 2019-12-24 DIAGNOSIS — E03.8 HYPOTHYROIDISM DUE TO HASHIMOTO'S THYROIDITIS: Primary | ICD-10-CM

## 2019-12-24 DIAGNOSIS — E06.3 HYPOTHYROIDISM DUE TO HASHIMOTO'S THYROIDITIS: Primary | ICD-10-CM

## 2019-12-24 RX ORDER — LEVOTHYROXINE SODIUM 0.07 MG/1
TABLET ORAL
Qty: 30 TABLET | Refills: 4 | Status: SHIPPED | OUTPATIENT
Start: 2019-12-24 | End: 2020-05-14

## 2020-01-13 ENCOUNTER — PATIENT MESSAGE (OUTPATIENT)
Dept: INTERNAL MEDICINE CLINIC | Age: 30
End: 2020-01-13

## 2020-01-29 DIAGNOSIS — E03.8 HYPOTHYROIDISM DUE TO HASHIMOTO'S THYROIDITIS: ICD-10-CM

## 2020-01-29 DIAGNOSIS — E06.3 HYPOTHYROIDISM DUE TO HASHIMOTO'S THYROIDITIS: ICD-10-CM

## 2020-01-29 DIAGNOSIS — Z00.00 ROUTINE GENERAL MEDICAL EXAMINATION AT A HEALTH CARE FACILITY: ICD-10-CM

## 2020-01-29 LAB
A/G RATIO: 1.8 (ref 1.1–2.2)
ALBUMIN SERPL-MCNC: 4.3 G/DL (ref 3.4–5)
ALP BLD-CCNC: 70 U/L (ref 40–129)
ALT SERPL-CCNC: 11 U/L (ref 10–40)
ANION GAP SERPL CALCULATED.3IONS-SCNC: 11 MMOL/L (ref 3–16)
AST SERPL-CCNC: 16 U/L (ref 15–37)
BILIRUB SERPL-MCNC: 0.5 MG/DL (ref 0–1)
BUN BLDV-MCNC: 15 MG/DL (ref 7–20)
CALCIUM SERPL-MCNC: 9.4 MG/DL (ref 8.3–10.6)
CHLORIDE BLD-SCNC: 106 MMOL/L (ref 99–110)
CHOLESTEROL, TOTAL: 121 MG/DL (ref 0–199)
CO2: 24 MMOL/L (ref 21–32)
CREAT SERPL-MCNC: 0.6 MG/DL (ref 0.6–1.1)
GFR AFRICAN AMERICAN: >60
GFR NON-AFRICAN AMERICAN: >60
GLOBULIN: 2.4 G/DL
GLUCOSE BLD-MCNC: 86 MG/DL (ref 70–99)
HDLC SERPL-MCNC: 57 MG/DL (ref 40–60)
LDL CHOLESTEROL CALCULATED: 56 MG/DL
POTASSIUM SERPL-SCNC: 4.4 MMOL/L (ref 3.5–5.1)
SODIUM BLD-SCNC: 141 MMOL/L (ref 136–145)
T4 FREE: 1.4 NG/DL (ref 0.9–1.8)
TOTAL PROTEIN: 6.7 G/DL (ref 6.4–8.2)
TRIGL SERPL-MCNC: 38 MG/DL (ref 0–150)
TSH REFLEX: 4.2 UIU/ML (ref 0.27–4.2)
VLDLC SERPL CALC-MCNC: 8 MG/DL

## 2020-02-05 ENCOUNTER — OFFICE VISIT (OUTPATIENT)
Dept: INTERNAL MEDICINE CLINIC | Age: 30
End: 2020-02-05
Payer: COMMERCIAL

## 2020-02-05 VITALS
BODY MASS INDEX: 24.62 KG/M2 | DIASTOLIC BLOOD PRESSURE: 70 MMHG | SYSTOLIC BLOOD PRESSURE: 116 MMHG | OXYGEN SATURATION: 99 % | HEART RATE: 78 BPM | WEIGHT: 139 LBS

## 2020-02-05 LAB
CONTROL: ABNORMAL
PREGNANCY TEST URINE, POC: POSITIVE

## 2020-02-05 PROCEDURE — 81025 URINE PREGNANCY TEST: CPT | Performed by: INTERNAL MEDICINE

## 2020-02-05 PROCEDURE — 99395 PREV VISIT EST AGE 18-39: CPT | Performed by: INTERNAL MEDICINE

## 2020-02-05 ASSESSMENT — PATIENT HEALTH QUESTIONNAIRE - PHQ9
SUM OF ALL RESPONSES TO PHQ QUESTIONS 1-9: 0
2. FEELING DOWN, DEPRESSED OR HOPELESS: 0
SUM OF ALL RESPONSES TO PHQ QUESTIONS 1-9: 0
1. LITTLE INTEREST OR PLEASURE IN DOING THINGS: 0
SUM OF ALL RESPONSES TO PHQ9 QUESTIONS 1 & 2: 0

## 2020-02-05 ASSESSMENT — ENCOUNTER SYMPTOMS
SHORTNESS OF BREATH: 0
WHEEZING: 0
BACK PAIN: 0
CONSTIPATION: 0
VOMITING: 0
ABDOMINAL DISTENTION: 0
DIARRHEA: 0
NAUSEA: 0
CHEST TIGHTNESS: 0
COUGH: 0

## 2020-02-05 NOTE — PROGRESS NOTES
2/5/20    Bright Livingston  1990      Chief Complaint   Patient presents with    Annual Exam       HPI  Here for a well exam.  Patient feels well. Thinks she may be pregnant. Past Medical History, Medications, Social History, Family History, Health Maintenance and Labs have been reviewed with Bright Livingston. Review of Systems   Constitutional: Negative for unexpected weight change. Respiratory: Negative for cough, chest tightness, shortness of breath and wheezing. Cardiovascular: Negative for chest pain, palpitations and leg swelling. Gastrointestinal: Negative for abdominal distention, constipation, diarrhea, nausea and vomiting. Musculoskeletal: Negative for arthralgias, back pain and myalgias. Neurological: Negative for tremors and numbness. All other systems reviewed and are negative. Current Outpatient Medications   Medication Sig Dispense Refill    levothyroxine (SYNTHROID) 75 MCG tablet TAKE ONE TABLET BY MOUTH DAILY 30 tablet 4    Lactobacillus (PROBIOTIC ACIDOPHILUS) TABS Take by mouth      fluticasone (FLONASE) 50 MCG/ACT nasal spray 1 spray by Each Nostril route as needed for Rhinitis      cetirizine (ZYRTEC) 10 MG tablet Take 10 mg by mouth daily       No current facility-administered medications for this visit. Physical Exam  Vitals signs reviewed. Constitutional:       General: She is not in acute distress. Appearance: She is well-developed. She is not diaphoretic. HENT:      Right Ear: External ear normal.      Left Ear: External ear normal.      Mouth/Throat:      Pharynx: No oropharyngeal exudate. Neck:      Musculoskeletal: Normal range of motion and neck supple. Thyroid: No thyromegaly. Cardiovascular:      Rate and Rhythm: Normal rate and regular rhythm. Pulses: Normal pulses. Heart sounds: Normal heart sounds. No murmur. No friction rub. No gallop.     Pulmonary:      Effort: Pulmonary effort is normal. No

## 2020-05-12 DIAGNOSIS — E03.8 HYPOTHYROIDISM DUE TO HASHIMOTO'S THYROIDITIS: ICD-10-CM

## 2020-05-12 DIAGNOSIS — E06.3 HYPOTHYROIDISM DUE TO HASHIMOTO'S THYROIDITIS: ICD-10-CM

## 2020-05-12 LAB
T4 FREE: 1.4 NG/DL (ref 0.9–1.8)
TSH SERPL DL<=0.05 MIU/L-ACNC: 3.06 UIU/ML (ref 0.27–4.2)

## 2020-05-14 RX ORDER — LEVOTHYROXINE SODIUM 88 UG/1
88 TABLET ORAL DAILY
Qty: 30 TABLET | Refills: 5 | Status: SHIPPED | OUTPATIENT
Start: 2020-05-14 | End: 2020-07-20

## 2020-05-14 RX ORDER — LEVOTHYROXINE SODIUM 88 UG/1
88 TABLET ORAL DAILY
Qty: 30 TABLET | Refills: 5 | Status: SHIPPED | OUTPATIENT
Start: 2020-05-14 | End: 2020-05-14 | Stop reason: SDUPTHER

## 2020-06-11 ENCOUNTER — TELEPHONE (OUTPATIENT)
Dept: ENDOCRINOLOGY | Age: 30
End: 2020-06-11

## 2020-06-11 NOTE — TELEPHONE ENCOUNTER
Pt called and asked to put in lab orders so she can have them done before her appt. She asked to be called when the orders are placed.

## 2020-06-15 DIAGNOSIS — E03.8 HYPOTHYROIDISM DUE TO HASHIMOTO'S THYROIDITIS: ICD-10-CM

## 2020-06-15 DIAGNOSIS — E06.3 HYPOTHYROIDISM DUE TO HASHIMOTO'S THYROIDITIS: ICD-10-CM

## 2020-06-16 ENCOUNTER — VIRTUAL VISIT (OUTPATIENT)
Dept: ENDOCRINOLOGY | Age: 30
End: 2020-06-16
Payer: COMMERCIAL

## 2020-06-16 LAB
T4 FREE: 1.5 NG/DL (ref 0.9–1.8)
TSH SERPL DL<=0.05 MIU/L-ACNC: 2.59 UIU/ML (ref 0.27–4.2)

## 2020-06-16 PROCEDURE — 99213 OFFICE O/P EST LOW 20 MIN: CPT | Performed by: INTERNAL MEDICINE

## 2020-06-16 ASSESSMENT — ENCOUNTER SYMPTOMS
VOMITING: 0
NAUSEA: 0
SORE THROAT: 0
SHORTNESS OF BREATH: 0
BACK PAIN: 0
COUGH: 0
ABDOMINAL PAIN: 0

## 2020-06-16 NOTE — PROGRESS NOTES
Endocrinology       [facility] M.D. Phone: [phone]   FAX: [id number]   YOB: 1990    Date of Visit:  6/16/2020    Allergies   Allergen Reactions    Topamax [Topiramate] Hives     Outpatient Medications Marked as Taking for the 6/16/20 encounter (Virtual Visit) with Angle Ansari MD   Medication Sig Dispense Refill    doxyLAMINE succinate (UNISOM SLEEPTABS) 25 MG tablet Take by mouth nightly      Pyridoxine HCl (B-6 PO) Take 75 mg by mouth daily      levothyroxine (SYNTHROID) 88 MCG tablet Take 1 tablet by mouth daily 30 tablet 5    fluticasone (FLONASE) 50 MCG/ACT nasal spray 1 spray by Each Nostril route as needed for Rhinitis      cetirizine (ZYRTEC) 10 MG tablet Take 10 mg by mouth daily           There were no vitals filed for this visit. There is no height or weight on file to calculate BMI. Wt Readings from Last 3 Encounters:   02/05/20 139 lb (63 kg)   12/17/19 139 lb (63 kg)   08/05/19 139 lb (63 kg)     BP Readings from Last 3 Encounters:   02/05/20 116/70   12/17/19 123/75   08/05/19 120/62        History reviewed. No pertinent past medical history.   Past Surgical History:   Procedure Laterality Date    TONSILLECTOMY AND ADENOIDECTOMY  07/25/2016    WISDOM TOOTH EXTRACTION       Family History   Problem Relation Age of Onset    Other Mother         migraines    Thyroid Disease Mother     Hypertension Mother     Thyroid Disease Father     Other Father         anxiety    Cancer Paternal Grandmother         breast    Thyroid Disease Brother         Thyroid nodule     Social History     Tobacco Use   Smoking Status Never Smoker   Smokeless Tobacco Never Used      Social History     Substance and Sexual Activity   Alcohol Use Yes    Alcohol/week: 3.0 standard drinks    Types: 3 Cans of beer per week       HPI      Claude Bright is a 34 y.o. female who is here for a follow-up for management of thyroid disease  PCP :  Zenobia Medrano

## 2020-06-17 ENCOUNTER — TELEPHONE (OUTPATIENT)
Dept: ENDOCRINOLOGY | Age: 30
End: 2020-06-17

## 2020-07-17 DIAGNOSIS — E03.8 HYPOTHYROIDISM DUE TO HASHIMOTO'S THYROIDITIS: ICD-10-CM

## 2020-07-17 DIAGNOSIS — E06.3 HYPOTHYROIDISM DUE TO HASHIMOTO'S THYROIDITIS: ICD-10-CM

## 2020-07-17 LAB
T4 FREE: 1.7 NG/DL (ref 0.9–1.8)
TSH SERPL DL<=0.05 MIU/L-ACNC: 3.43 UIU/ML (ref 0.27–4.2)
TSH SERPL DL<=0.05 MIU/L-ACNC: 3.43 UIU/ML (ref 0.27–4.2)

## 2020-07-20 RX ORDER — LEVOTHYROXINE SODIUM 0.1 MG/1
100 TABLET ORAL DAILY
Qty: 30 TABLET | Refills: 5 | Status: SHIPPED | OUTPATIENT
Start: 2020-07-20 | End: 2020-12-21 | Stop reason: SDUPTHER

## 2020-08-18 DIAGNOSIS — E03.8 HYPOTHYROIDISM DUE TO HASHIMOTO'S THYROIDITIS: ICD-10-CM

## 2020-08-18 DIAGNOSIS — E06.3 HYPOTHYROIDISM DUE TO HASHIMOTO'S THYROIDITIS: ICD-10-CM

## 2020-08-19 LAB
T4 FREE: 1.3 NG/DL (ref 0.9–1.8)
TSH SERPL DL<=0.05 MIU/L-ACNC: 1.29 UIU/ML (ref 0.27–4.2)

## 2020-09-24 ENCOUNTER — OFFICE VISIT (OUTPATIENT)
Dept: ENDOCRINOLOGY | Age: 30
End: 2020-09-24
Payer: COMMERCIAL

## 2020-09-24 VITALS
DIASTOLIC BLOOD PRESSURE: 76 MMHG | SYSTOLIC BLOOD PRESSURE: 111 MMHG | WEIGHT: 155 LBS | HEART RATE: 87 BPM | BODY MASS INDEX: 27.46 KG/M2

## 2020-09-24 PROCEDURE — 99213 OFFICE O/P EST LOW 20 MIN: CPT | Performed by: INTERNAL MEDICINE

## 2020-09-24 ASSESSMENT — ENCOUNTER SYMPTOMS
BACK PAIN: 0
SORE THROAT: 0
ABDOMINAL PAIN: 0
VOMITING: 0
SHORTNESS OF BREATH: 0
COUGH: 0
NAUSEA: 0

## 2020-09-24 NOTE — PROGRESS NOTES
Endocrinology       Para Elisa MALONEY Phone: [phone]   FAX: [address]   YOB: 1990    Date of Visit:  9/24/2020    Allergies   Allergen Reactions    Topamax [Topiramate] Hives     Outpatient Medications Marked as Taking for the 9/24/20 encounter (Office Visit) with Franny Felipe MD   Medication Sig Dispense Refill    levothyroxine (SYNTHROID) 100 MCG tablet Take 1 tablet by mouth daily 30 tablet 5    Prenatal Vit-Fe Fumarate-FA (PRENATAL 1+1 PO) Take 1 tablet by mouth daily      doxyLAMINE succinate (UNISOM SLEEPTABS) 25 MG tablet Take by mouth nightly      fluticasone (FLONASE) 50 MCG/ACT nasal spray 1 spray by Each Nostril route as needed for Rhinitis      cetirizine (ZYRTEC) 10 MG tablet Take 10 mg by mouth daily           Vitals:    09/24/20 0925   BP: 111/76   Pulse: 87   Weight: 155 lb (70.3 kg)     Body mass index is 27.46 kg/m². Wt Readings from Last 3 Encounters:   09/24/20 155 lb (70.3 kg)   02/05/20 139 lb (63 kg)   12/17/19 139 lb (63 kg)     BP Readings from Last 3 Encounters:   09/24/20 111/76   02/05/20 116/70   12/17/19 123/75        History reviewed. No pertinent past medical history.   Past Surgical History:   Procedure Laterality Date    TONSILLECTOMY AND ADENOIDECTOMY  07/25/2016    WISDOM TOOTH EXTRACTION       Family History   Problem Relation Age of Onset    Other Mother         migraines    Thyroid Disease Mother     Hypertension Mother     Thyroid Disease Father     Other Father         anxiety    Cancer Paternal Grandmother         breast    Thyroid Disease Brother         Thyroid nodule     Social History     Tobacco Use   Smoking Status Never Smoker   Smokeless Tobacco Never Used      Social History     Substance and Sexual Activity   Alcohol Use Yes    Alcohol/week: 3.0 standard drinks    Types: 3 Cans of beer per week       COREY Waters is a 34 y.o. female who is here for a follow-up for management of thyroid disease  PCP :  Jhon Cash MD           Diagnosed with Hypothyroidism in 05/18    Was having allergies  Saw allergy specialist who ordered labs. Labs in 05/18 showed TSH of 5.9      Current thyroid medication: levothyroxine 100  mcg daily since 07/20   Dose adjusted as she was pregnant    She was on levothyroxine 88 mcg daily before pregnancy     Takes it first thing in the morning on empty stomach- yes  Interfering medications including calcium, vitamin D , iron tablets, Proton pump inhibitors: no    FH of hypothyroidism: Both parents have hypothyroidism. Brother has enlarged thyroid. FH of thyroid cancer: no    She is feeling less tired and fatigued since she started levothyroxine. Weight is stable. No dry skin , hair loss. No difficulty swallowing    She is engaged. Planning a child in 2020. Works for [employer]. Review of Systems   HENT: Negative for sore throat. Respiratory: Negative for cough and shortness of breath. Cardiovascular: Negative for chest pain and palpitations. Gastrointestinal: Negative for abdominal pain, nausea and vomiting. Endocrine: Negative for polydipsia. Genitourinary: Negative for frequency and urgency. Musculoskeletal: Negative for back pain and myalgias. Skin: Negative for rash. Neurological: Negative for headaches. Psychiatric/Behavioral: The patient is not nervous/anxious. Thyroid ultrasound 06/18        HISTORY: Thyroid nodule       Right thyroid lobe 1.5 x 1.3 x 4.9 cm       Left lobe 1.7 x 1.4 x 4.0 cm       Thyroid isthmus 3 mm.       Entire gland demonstrates diffuse heterogeneous appearance without   discrete dominant nodule. No extrathyroidal mass identified.           Impression       Normal thyroid size. Heterogeneous appearance is nonspecific but   can be associated with thyroiditis-please correlate clinically               Assessment/Plan      1.  Hypothyroidism    This 34 yrs old female was diagnosed with hypothyroidism in 05/18    Had thyroid US in 06/18    US showed heterogenous parenchyma. US appearance consistent with hashimoto's thyroiditis. TPO antibodies +ve    She is on levothyroxine 100  mcg daily since 07/20     She is 24 weeks pregnant       Discussed management of thyroid disease during pregnancy ,     TSH 3.43---> 1.29---> 1.24     Continue same dose.      Will repeat labs in 6 weeks again      Will send message on my chart

## 2020-11-10 DIAGNOSIS — E06.3 HYPOTHYROIDISM DUE TO HASHIMOTO'S THYROIDITIS: ICD-10-CM

## 2020-11-10 DIAGNOSIS — E03.8 HYPOTHYROIDISM DUE TO HASHIMOTO'S THYROIDITIS: ICD-10-CM

## 2020-11-10 LAB
T4 FREE: 1.1 NG/DL (ref 0.9–1.8)
TSH SERPL DL<=0.05 MIU/L-ACNC: 1.14 UIU/ML (ref 0.27–4.2)

## 2020-12-21 NOTE — TELEPHONE ENCOUNTER
Medication:   Requested Prescriptions     Pending Prescriptions Disp Refills    levothyroxine (SYNTHROID) 100 MCG tablet 30 tablet 5     Sig: Take 1 tablet by mouth daily       Last appt: 9/24/2020   Next appt: 3/23/2021    Last Thyroid:   Lab Results   Component Value Date    TSH 1.14 11/10/2020    T4FREE 1.1 11/10/2020

## 2020-12-22 RX ORDER — LEVOTHYROXINE SODIUM 0.1 MG/1
100 TABLET ORAL DAILY
Qty: 90 TABLET | Refills: 1 | Status: SHIPPED | OUTPATIENT
Start: 2020-12-22 | End: 2021-01-19 | Stop reason: SDUPTHER

## 2021-01-19 RX ORDER — LEVOTHYROXINE SODIUM 0.1 MG/1
100 TABLET ORAL DAILY
Qty: 90 TABLET | Refills: 1 | Status: SHIPPED
Start: 2021-01-19 | End: 2021-03-26 | Stop reason: DRUGHIGH

## 2021-01-19 NOTE — TELEPHONE ENCOUNTER
Medication:   Requested Prescriptions     Pending Prescriptions Disp Refills    levothyroxine (SYNTHROID) 100 MCG tablet 90 tablet 1     Sig: Take 1 tablet by mouth daily         Last appt: 9/24/2020   Next appt: 3/23/2021    Last Thyroid:   Lab Results   Component Value Date    TSH 1.14 11/10/2020    T4FREE 1.1 11/10/2020

## 2021-03-23 ENCOUNTER — OFFICE VISIT (OUTPATIENT)
Dept: ENDOCRINOLOGY | Age: 31
End: 2021-03-23
Payer: COMMERCIAL

## 2021-03-23 VITALS
OXYGEN SATURATION: 96 % | BODY MASS INDEX: 23.56 KG/M2 | SYSTOLIC BLOOD PRESSURE: 119 MMHG | DIASTOLIC BLOOD PRESSURE: 70 MMHG | WEIGHT: 133 LBS | HEART RATE: 100 BPM

## 2021-03-23 DIAGNOSIS — E06.3 HYPOTHYROIDISM DUE TO HASHIMOTO'S THYROIDITIS: Primary | ICD-10-CM

## 2021-03-23 DIAGNOSIS — E03.8 HYPOTHYROIDISM DUE TO HASHIMOTO'S THYROIDITIS: ICD-10-CM

## 2021-03-23 DIAGNOSIS — E03.8 HYPOTHYROIDISM DUE TO HASHIMOTO'S THYROIDITIS: Primary | ICD-10-CM

## 2021-03-23 DIAGNOSIS — E06.3 HYPOTHYROIDISM DUE TO HASHIMOTO'S THYROIDITIS: ICD-10-CM

## 2021-03-23 LAB
T4 FREE: 4.2 NG/DL (ref 0.9–1.8)
TSH SERPL DL<=0.05 MIU/L-ACNC: <0.01 UIU/ML (ref 0.27–4.2)

## 2021-03-23 PROCEDURE — 99213 OFFICE O/P EST LOW 20 MIN: CPT | Performed by: NURSE PRACTITIONER

## 2021-03-23 NOTE — PROGRESS NOTES
Endocrinology  Gris Martinez, JENN, [address] [facility] [address] [facility], [address]  Phone [phone]  Fax [phone]    Raad Villa is a 27 y.o. female who presents for evaluation of Hashimoto's Disease. Referring Provider: Easton Ruggiero MD     Last A1C: No results found for: LABA1C  Last BP Readings:   BP Readings from Last 3 Encounters:   03/23/21 119/70   09/24/20 111/76   02/05/20 116/70     Last LDL:   Lab Results   Component Value Date    LDLCALC 56 01/29/2020     Aspirin Use: no    Tobacco/Alcohol History:   Tobacco Use    Smoking status: Never Smoker    Smokeless tobacco: Never Used   Substance and Sexual Activity    Alcohol use: Yes     Alcohol/week: 3.0 standard drinks     Types: 3 Cans of beer per week        Thyroid:     Diagnosed with Hypothyroidism in 05/18     Had a h/o having allergies, and saw allergy specialist who ordered labs. Labs in 05/18 showed TSH of 5.9    She was on levothyroxine 88 mcg daily before pregnancy    Current thyroid medication: levothyroxine 100  mcg daily since 07/20   Dose adjusted as she was pregnant, delivered 12/2020  Has lost 35 lbs since delivery, 5 lbs less than pre pregnancy weight     Takes it first thing in the morning on empty stomach- yes  Interfering medications including calcium, vitamin D , iron tablets, Proton pump inhibitors: no    She is feeling less tired and fatigued since she started levothyroxine.   Weight is stable. No dry skin , hair loss. No difficulty swallowing  FH of hypothyroidism: Both parents have hypothyroidism and her brother has enlarged thyroid. FH of thyroid cancer: no  Exposure to radiation or use of iodine supplements: no  Works for [employer]. Lab Results   Component Value Date    TSH 1.14 11/10/2020    TSH 1.29 08/18/2020    TSH 3.43 07/17/2020    TSH 3.43 07/17/2020    T4FREE 1.1 11/10/2020    T4FREE 1.3 08/18/2020    T4FREE 1.7 07/17/2020     History reviewed.  No pertinent past medical history. Family History   Problem Relation Age of Onset    Other Mother         migraines    Thyroid Disease Mother     Hypertension Mother     Thyroid Disease Father     Other Father         anxiety    Cancer Paternal Grandmother         breast    Thyroid Disease Brother         Thyroid nodule     Current Outpatient Medications   Medication Sig Dispense Refill    levothyroxine (SYNTHROID) 100 MCG tablet Take 1 tablet by mouth daily 90 tablet 1    Prenatal Vit-Fe Fumarate-FA (PRENATAL 1+1 PO) Take 1 tablet by mouth daily      fluticasone (FLONASE) 50 MCG/ACT nasal spray 1 spray by Each Nostril route as needed for Rhinitis      cetirizine (ZYRTEC) 10 MG tablet Take 10 mg by mouth daily      doxyLAMINE succinate (UNISOM SLEEPTABS) 25 MG tablet Take by mouth nightly      Pyridoxine HCl (B-6 PO) Take 75 mg by mouth daily       No current facility-administered medications for this visit. Review of Systems     Vitals:    03/23/21 1346   BP: 119/70   Site: Right Upper Arm   Position: Sitting   Cuff Size: Large Adult   Pulse: 100   SpO2: 96%   Weight: 133 lb (60.3 kg)     Physical Exam  Constitutional:       Appearance: She is well-developed. She is not diaphoretic. HENT:      Head: Normocephalic and atraumatic. Eyes:      Conjunctiva/sclera: Conjunctivae normal.      Pupils: Pupils are equal, round, and reactive to light. Comments: No evidence of proptosis   Neck:      Musculoskeletal: Normal range of motion and neck supple. Thyroid: No thyromegaly. Cardiovascular:      Rate and Rhythm: Normal rate and regular rhythm. Pulmonary:      Effort: Pulmonary effort is normal.      Breath sounds: Normal breath sounds. Abdominal:      General: Bowel sounds are normal.      Palpations: Abdomen is soft. Musculoskeletal: Normal range of motion. General: No tenderness. Skin:     General: Skin is warm and dry.       Comments: No skin changes b/l shins   Neurological:      Mental

## 2021-03-23 NOTE — ASSESSMENT & PLAN NOTE
1. Check TSH, FT4,   2. Furthur treatment based on results  3.  Ensure off of biotin for 2-3 days prior to labs

## 2021-03-25 ENCOUNTER — TELEPHONE (OUTPATIENT)
Dept: ENDOCRINOLOGY | Age: 31
End: 2021-03-25

## 2021-03-26 DIAGNOSIS — E06.3 HYPOTHYROIDISM DUE TO HASHIMOTO'S THYROIDITIS: Primary | ICD-10-CM

## 2021-03-26 DIAGNOSIS — E03.8 HYPOTHYROIDISM DUE TO HASHIMOTO'S THYROIDITIS: Primary | ICD-10-CM

## 2021-03-26 RX ORDER — LEVOTHYROXINE SODIUM 88 UG/1
88 TABLET ORAL DAILY
Qty: 30 TABLET | Refills: 3 | Status: SHIPPED | OUTPATIENT
Start: 2021-03-26 | End: 2021-07-26

## 2021-03-26 NOTE — TELEPHONE ENCOUNTER
Called patient  She is hyperthyroid at 100 mcg, will decrease to 88 mcg and repat labs in 4-6 weeks  Lab orders are in the chart, rx sent

## 2021-04-21 ENCOUNTER — PATIENT MESSAGE (OUTPATIENT)
Dept: INTERNAL MEDICINE CLINIC | Age: 31
End: 2021-04-21

## 2021-04-21 NOTE — TELEPHONE ENCOUNTER
From: Carie Acosta  To: Maria G Hyatt MD  Sent: 4/21/2021 2:10 PM EDT  Subject: Non-Urgent Medical Question    Can you please add my covid 19 vaccine to my health record?

## 2021-04-26 DIAGNOSIS — E06.3 HYPOTHYROIDISM DUE TO HASHIMOTO'S THYROIDITIS: ICD-10-CM

## 2021-04-26 DIAGNOSIS — E03.8 HYPOTHYROIDISM DUE TO HASHIMOTO'S THYROIDITIS: ICD-10-CM

## 2021-04-26 LAB
T4 FREE: 1.5 NG/DL (ref 0.9–1.8)
TSH SERPL DL<=0.05 MIU/L-ACNC: 0.01 UIU/ML (ref 0.27–4.2)

## 2021-05-12 DIAGNOSIS — E06.3 HYPOTHYROIDISM DUE TO HASHIMOTO'S THYROIDITIS: Primary | ICD-10-CM

## 2021-05-12 DIAGNOSIS — E03.8 HYPOTHYROIDISM DUE TO HASHIMOTO'S THYROIDITIS: Primary | ICD-10-CM

## 2021-07-23 DIAGNOSIS — E03.8 HYPOTHYROIDISM DUE TO HASHIMOTO'S THYROIDITIS: ICD-10-CM

## 2021-07-23 DIAGNOSIS — E06.3 HYPOTHYROIDISM DUE TO HASHIMOTO'S THYROIDITIS: ICD-10-CM

## 2021-07-26 RX ORDER — LEVOTHYROXINE SODIUM 88 UG/1
TABLET ORAL
Qty: 30 TABLET | Refills: 3 | Status: SHIPPED | OUTPATIENT
Start: 2021-07-26 | End: 2021-11-19 | Stop reason: SDUPTHER

## 2021-07-27 ENCOUNTER — TELEPHONE (OUTPATIENT)
Dept: INTERNAL MEDICINE CLINIC | Age: 31
End: 2021-07-27

## 2021-07-27 NOTE — TELEPHONE ENCOUNTER
Received request from [facility]- faxed to [facility] for processing on 7.27.21. For status update, call [phone] option 1.

## 2021-07-28 ENCOUNTER — TELEPHONE (OUTPATIENT)
Dept: INTERNAL MEDICINE CLINIC | Age: 31
End: 2021-07-28

## 2021-07-28 NOTE — TELEPHONE ENCOUNTER
----- Message from Sal Armstrong sent at 7/28/2021  1:21 PM EDT -----  Subject: Message to Provider    QUESTIONS  Information for Provider? July for [facility] retrievals is requesting medical   records fax to [phone] please attn to case #? [id number]. July from [facility] retrievals will be faxing to you the verifier from the pt. the call back   number is [phone] ext [phone].  ---------------------------------------------------------------------------  --------------  CALL BACK INFO  What is the best way for the office to contact you? OK to leave message on   voicemail  Preferred Call Back Phone Number? [phone]  ---------------------------------------------------------------------------  --------------  SCRIPT ANSWERS  Relationship to Patient? Third Party  Representative Name?  July

## 2021-11-19 DIAGNOSIS — E03.8 HYPOTHYROIDISM DUE TO HASHIMOTO'S THYROIDITIS: ICD-10-CM

## 2021-11-19 DIAGNOSIS — E06.3 HYPOTHYROIDISM DUE TO HASHIMOTO'S THYROIDITIS: ICD-10-CM

## 2021-11-19 RX ORDER — LEVOTHYROXINE SODIUM 88 UG/1
TABLET ORAL
Qty: 30 TABLET | Refills: 3 | Status: SHIPPED | OUTPATIENT
Start: 2021-11-19 | End: 2022-02-10 | Stop reason: SDUPTHER

## 2022-01-25 ENCOUNTER — TELEPHONE (OUTPATIENT)
Dept: ENDOCRINOLOGY | Age: 32
End: 2022-01-25

## 2022-01-25 DIAGNOSIS — E03.8 HYPOTHYROIDISM DUE TO HASHIMOTO'S THYROIDITIS: Primary | ICD-10-CM

## 2022-01-25 DIAGNOSIS — E06.3 HYPOTHYROIDISM DUE TO HASHIMOTO'S THYROIDITIS: Primary | ICD-10-CM

## 2022-01-25 NOTE — TELEPHONE ENCOUNTER
Pt has a follow up appt with Nano Keating on 2/10//2022.   Pt says that she needs to have labs ordered prior to that appt

## 2022-02-08 DIAGNOSIS — E06.3 HYPOTHYROIDISM DUE TO HASHIMOTO'S THYROIDITIS: ICD-10-CM

## 2022-02-08 DIAGNOSIS — E03.8 HYPOTHYROIDISM DUE TO HASHIMOTO'S THYROIDITIS: ICD-10-CM

## 2022-02-08 LAB
T4 FREE: 1.4 NG/DL (ref 0.9–1.8)
TSH SERPL DL<=0.05 MIU/L-ACNC: 2.16 UIU/ML (ref 0.27–4.2)

## 2022-02-10 ENCOUNTER — VIRTUAL VISIT (OUTPATIENT)
Dept: ENDOCRINOLOGY | Age: 32
End: 2022-02-10
Payer: COMMERCIAL

## 2022-02-10 DIAGNOSIS — E06.3 HYPOTHYROIDISM DUE TO HASHIMOTO'S THYROIDITIS: Primary | ICD-10-CM

## 2022-02-10 DIAGNOSIS — E03.8 HYPOTHYROIDISM DUE TO HASHIMOTO'S THYROIDITIS: Primary | ICD-10-CM

## 2022-02-10 PROCEDURE — 99441 PR PHYS/QHP TELEPHONE EVALUATION 5-10 MIN: CPT | Performed by: NURSE PRACTITIONER

## 2022-02-10 RX ORDER — LEVOTHYROXINE SODIUM 88 UG/1
TABLET ORAL
Qty: 30 TABLET | Refills: 3 | Status: SHIPPED | OUTPATIENT
Start: 2022-02-10

## 2022-02-10 RX ORDER — ACETAMINOPHEN 160 MG
TABLET,DISINTEGRATING ORAL
COMMUNITY

## 2022-02-10 ASSESSMENT — ENCOUNTER SYMPTOMS
ABDOMINAL PAIN: 0
BACK PAIN: 0
CONSTIPATION: 0
DIARRHEA: 0
EYE PAIN: 0
SHORTNESS OF BREATH: 0
COLOR CHANGE: 0

## 2022-02-10 NOTE — ASSESSMENT & PLAN NOTE
Reviewed labs, T4 stable, TSH lower but in range  Has lost 12 lbs (intentional) and may need dose adjustment in future  Repeat TFT in 8-10 weeks    Further management based on results, continue on current dose at this time  Ensure off of biotin for 2-3 days prior to labs

## 2022-02-10 NOTE — PROGRESS NOTES
Endocrinology  Jeniffer Rosa, JENN, [address]  [place], [address]  Phone [phone]  Fax [phone]    Lalo Edwards is  being evaluated by a Virtual Visit (phone visit) encounter to address concerns as mentioned above. Due to this being a TeleHealth encounter (During [facility] public [facility] emergency), evaluation of the following organ systems was limited: Vitals/Constitutional/EENT/Resp/CV/GI//MS/Neuro/Skin/Heme-Lymph-Imm. Pursuant to the emergency declaration under the [address] [place] [address], [address] [place] [address] authority and the [employer] Act, this Virtual Visit was conducted with patient's (and/or legal guardian's) consent, to reduce the patient's risk of exposure to COVID-19 and provide necessary medical care. The patient (and/or legal guardian) has also been advised to contact this office for worsening conditions or problems, and seek emergency medical treatment and/or call 911 if deemed necessary. Services were provided through a phone synchronous discussion virtually to substitute for in-person clinic visit. Patient and provider were located at their individual homes    Patient was identified via name,  on the phone visit    Lalo Edwards is a 32 y.o. female who presents for evaluation of Hashimoto's Disease. Referring Provider: No primary care provider on file. Last A1C: No results found for: LABA1C  Last BP Readings:   BP Readings from Last 3 Encounters:   21 119/70   20 111/76   20 116/70     Last LDL:   Lab Results   Component Value Date    LDLCALC 56 2020     Aspirin Use: no    Tobacco/Alcohol History:   Tobacco Use    Smoking status: Never Smoker    Smokeless tobacco: Never Used   Substance and Sexual Activity    Alcohol use:  Yes     Alcohol/week: 3.0 standard drinks     Types: 3 Cans of beer per week        Thyroid: Diagnosed with Hypothyroidism in 05/18     Had a h/o having allergies, and saw allergy specialist who ordered labs. Labs in 05/18 showed TSH of 5.9    She was on levothyroxine 88 mcg daily before pregnancy    Current thyroid medication: levothyroxine 100  mcg daily since 07/20   Dose adjusted as she was pregnant, delivered 12/2020  Has lost 35 lbs since delivery, 5 lbs less than pre pregnancy weight     Takes it first thing in the morning on empty stomach- yes  Interfering medications including calcium, vitamin D , iron tablets, Proton pump inhibitors: no    She is feeling less tired and fatigued since she started levothyroxine.   Trying to lose weight, has dropped to 120 lbs  Currently breast feeding  No dry skin , hair loss. No difficulty swallowing  FH of hypothyroidism: Both parents have hypothyroidism and her brother has enlarged thyroid. FH of thyroid cancer: no  Exposure to radiation or use of iodine supplements: no  Works for [employer]. Lab Results   Component Value Date    TSH 2.16 02/08/2022    TSH 3.73 08/20/2021    TSH 0.01 04/26/2021    T4FREE 1.4 02/08/2022    T4FREE 1.5 08/20/2021    T4FREE 1.5 04/26/2021     History reviewed. No pertinent past medical history.   Family History   Problem Relation Age of Onset    Other Mother         migraines    Thyroid Disease Mother     Hypertension Mother     Thyroid Disease Father     Other Father         anxiety    Cancer Paternal Grandmother         breast    Thyroid Disease Brother         Thyroid nodule     Current Outpatient Medications   Medication Sig Dispense Refill    Cholecalciferol (VITAMIN D3) 50 MCG (2000 UT) CAPS Take by mouth      levothyroxine (SYNTHROID) 88 MCG tablet TAKE ONE TABLET BY MOUTH DAILY 30 tablet 3    fluticasone (FLONASE) 50 MCG/ACT nasal spray 1 spray by Each Nostril route as needed for Rhinitis      cetirizine (ZYRTEC) 10 MG tablet Take 10 mg by mouth daily      doxyLAMINE succinate (UNISOM SLEEPTABS) 25 MG tablet Take by mouth nightly      Pyridoxine HCl (B-6 PO) Take 75 mg by mouth daily       No current facility-administered medications for this visit. Review of Systems   Constitutional: Negative for activity change, appetite change, diaphoresis, fatigue and unexpected weight change. Eyes: Negative for pain and visual disturbance. Respiratory: Negative for shortness of breath. Cardiovascular: Negative for palpitations and leg swelling. Gastrointestinal: Negative for abdominal pain, constipation and diarrhea. Endocrine: Negative for cold intolerance, heat intolerance, polydipsia, polyphagia and polyuria. Musculoskeletal: Negative for back pain, gait problem, myalgias and neck pain. Skin: Negative for color change and pallor. Neurological: Negative for dizziness, weakness, light-headedness and headaches. Hematological: Does not bruise/bleed easily. Psychiatric/Behavioral: Negative for sleep disturbance. The patient is not nervous/anxious and is not hyperactive. There were no vitals filed for this visit. televisit    Physical Exam televisit    Assessment  Charleense January is a 32 y.o. female with hypothyroidism     Plan  Problem List Items Addressed This Visit     Hypothyroidism due to Hashimoto's thyroiditis - Primary     Reviewed labs, T4 stable, TSH lower but in range  Has lost 12 lbs (intentional) and may need dose adjustment in future  Repeat TFT in 8-10 weeks    Further management based on results, continue on current dose at this time  Ensure off of biotin for 2-3 days prior to labs           Relevant Medications    levothyroxine (SYNTHROID) 88 MCG tablet    Other Relevant Orders    T4, Free    TSH without Reflex          No follow-ups on file.